# Patient Record
Sex: MALE | Race: WHITE | Employment: FULL TIME | URBAN - METROPOLITAN AREA
[De-identification: names, ages, dates, MRNs, and addresses within clinical notes are randomized per-mention and may not be internally consistent; named-entity substitution may affect disease eponyms.]

---

## 2020-01-09 ENCOUNTER — OFFICE VISIT (OUTPATIENT)
Dept: DERMATOLOGY | Facility: AMBULATORY SURGERY CENTER | Age: 60
End: 2020-01-09

## 2020-01-09 VITALS
OXYGEN SATURATION: 98 % | WEIGHT: 210 LBS | DIASTOLIC BLOOD PRESSURE: 64 MMHG | HEIGHT: 75 IN | BODY MASS INDEX: 26.11 KG/M2 | TEMPERATURE: 98.6 F | SYSTOLIC BLOOD PRESSURE: 124 MMHG | HEART RATE: 61 BPM

## 2020-01-09 DIAGNOSIS — C44.319 BASAL CELL CARCINOMA (BCC) OF RIGHT FOREHEAD: Primary | ICD-10-CM

## 2020-01-09 RX ORDER — ATORVASTATIN CALCIUM 20 MG/1
TABLET, FILM COATED ORAL
Refills: 0 | COMMUNITY
Start: 2019-10-17

## 2020-01-09 NOTE — PROGRESS NOTES
Progress note for Mohs surgery patient:    Chief Complaint:  basal cell carcinoma of the right forehead    HPI:  Jessica Aguirre is a 60y.o. year old male referred by  Eli Martinez MD for Mohs surgery to treat the following lesion:  Lesion Info  Location: right forehead  Size: 0.8 cm x 0.6 cm   Type: basal  Duration: months  Path Lab: [facility]  Path #: [id number]  Prior Treatment: none     Symptoms of the lesion include none. ROS:  Latrelle Sacks is feeling well and in their usual state of health today. He is not in pain. He does not have any other skin concerns. Exam:  Latrelle Sacks is an awake, alert, oriented, well-appearing male in no distress. The face was examined. Findings are:  On the right forehead there is a small pink scar. There are scattered erythematous scaly papules on the forehead and cheeks. A/P:  basal cell carcinoma of the right forehead. The diagnosis was reviewed. The Mohs surgery procedure was reviewed. Indications, risks, and options were discussed with Mr. Lala Waller preoperatively. Risks including, but not limited to: pain, bleeding, infection, tumor recurrence, scarring and damage to motor and/or sensory nerves, were discussed. Mr. Lala Waller chose Mohs surgery. Mr. Lala Waller was an acceptable surgery candidate. I performed Mohs surgery using standard technique after verbal and written consent were obtained. The lesion was identified and confirmed with the patient and photograph, if available. The surgical site was marked with gentian violet, prepped, draped and anesthetized in standard fashion. The tumor was debulked by curettage and orientation hashes were placed. The tumor and any associated scar was excised using beveled incision. Hemostasis was achieved, the site was bandaged, and the tissue was transported to the Mohs lab.   While maintaining anatomic orientation the tissue was divided, if needed, and marked with colored inks that were noted on the corresponding Mohs map. The tissue was prepared by Mohs en-face technique for fresh frozen section analysis. The resulting slides were examined for residual tumor, scar and other concerns, all of which were marked on the corresponding Mohs map, if present. The Mohs map was used to guide subsequent stages of surgery, if necessary, and the above process was repeated until a tumor-free plane was achieved. Once the tumor was cleared the map was marked as such and signed. Dr. Dayron Lara acted as surgeon and pathologist for the entire case, performing all stages of the surgical excision as well as examination and interpretation of the histologic slides. See table below for details regarding the surgical case. 1 stage(s) were required to reach a tumor-free plane, resulting in a 0.9 cm x 0.8 cm  defect extending to the subcutaneous fat. There were not complications. Kasie Belle will follow up as needed in the postoperative period. Regular skin examinations will be with  Alexis Piña MD.    The wound management options of second intent healing, layered closure, local flap, and/or full thickness skin graft were discussed. Mr. Jcarlos Mcbride understands the aims, risks, alternatives, and possible complications and elects to proceed with a complex layered closure. Wound margins were debeveled, standing cones were removed at both ends, edges widely undermined in the subcutaneous plane (approximately 1 cm circumferentially followed by complex layered closure. The wound was closed with buried 5-0 monocryl suture in the muscle and deep subcutis to reduce width of the wound and a second layer in the dermis to reduce tension on the skin edges with careful attention to edge apposition and eversion for optimal cosmesis. Epidermal edges were carefully approximated with 6-0 prolene suture, again with careful attention to apposition and eversion. The final closure length was Closure Length: 3.2 cm .   The wound was bandaged with Petrolatum ointment, Telfa, gauze and Coverroll. Wound care instructions (written and/or verbal) and a follow up appointment were given to Mr. Alexander Le before discharge. Mr. Alexander Le was discharged in good condition. right forehead  Mohs Lesion Operative Report  Date: 01/09/20  Room: Procedure room 1 and procedure room 2  Indications: Site, Poor definition  Pre-op Meds: none  Pre-op BP: 122/78  Pre-op pulse: 70  1st Assistant: Virginia Russ and Leti Lui  Stage #: 1  Stage 1 Sections: 1  Stage 1 # Pos: 0  Perineural Involvement: No  Lymphadenopathy: No  Defect Size: 0.9 cm x 0.8 cm   Depth: subcutaneous fat  Wound Mgt: complex  Suture: Buried, Surface  Buried details: 5.0 monocryl  Surface Details: 6.0 prolene  Undermining: SubQ  Closure Length: 3.2 cm   Estimated Blood Loss: 2 ml  Hemostasis: Pressure, Electrosurgery  Anesthesia: 1% Lidocaine w/1:100,000 epi  1% Lidocaine: 7 cc  Complications: none  Dressing: pressure  Post-op BP: 124/64  Post-op Pulse: 61  Pos-op Meds: none  W/C Instructions: Verbal, Written  Follow-up: 1 week     [facility]   OFFICE PROCEDURE PROGRESS NOTE   Chart reviewed for the following:   Daniel Lester MD have reviewed the History, Physical and updated the Allergic reactions for Frank Washington. TIME OUT performed immediately prior to start of procedure:   Daniel Lester MD, have performed the following reviews on Storm Read   prior to the start of the procedure:     * Patient was identified by name and date of birth   * Agreement on procedure being performed was verified   * Risks and Benefits explained to the patient   * Procedure site verified and marked as necessary   * Patient was positioned for comfort   * Consent was signed and verified     Time: 8 AM  Date of procedure: 1/9/2020  Procedure performed by:  Tatiana Quan MD  Provider assisted by: Virginia Cancino JOSELITO Brush  Patient assisted by: self   How tolerated by patient: tolerated the procedure well with no complications   Comments: none

## 2020-01-09 NOTE — PATIENT INSTRUCTIONS

## 2020-01-09 NOTE — LETTER
1/9/2020 12:08 PM 
 
Patient:  Obie Mcgraw YOB: 1960 Date of Visit: 1/9/2020 Dear Shanita Pak MD 
[address] [id number] VIA Facsimile: [phone] Thank you for referring Obie Mcgraw to me for evaluation/treatment. Below are the relevant portions of my assessment and plan of care. Mr. Alirio Hobson presented today for Mohs surgery to treat a biopsy-proven basal cell carcinoma of the right forehead. 1 stage(s) of Mohs surgery were required to achieve tumor free margins. I repaired the defect with a(n) primary closure. He tolerated the procedure well. Please see the attached procedure note(s) for additional details. Mr. Alirio Hobson will return to me for suture removal and/or wound checks at an appropriate interval and I will follow-up with him regarding any issues arising from or relating to this surgery. I will otherwise defer any additional dermatologic care back to you. If you have questions, please do not hesitate to call me. I look forward to following Mr. Alirio Hobson along with you. Sincerely, Carla Martines MD 
[phone] (cell)

## 2020-01-17 ENCOUNTER — OFFICE VISIT (OUTPATIENT)
Dept: DERMATOLOGY | Facility: AMBULATORY SURGERY CENTER | Age: 60
End: 2020-01-17

## 2020-01-17 DIAGNOSIS — C44.319 BASAL CELL CARCINOMA (BCC) OF RIGHT FOREHEAD: Primary | ICD-10-CM

## 2020-01-17 NOTE — PROGRESS NOTES
Wound check/suture removal:    Chief complaint: wound check. HPI: Trenton Rebollar presents for wound check following Mohs surgery to treat a biopsy-proven basal cell carcinoma on the right forehead repaired primarily performed 1 week ago. Exam: The surgical site was examined. There is not evidence of infection. There is erythema. There is not edema. A/P:  Wound check. The surgical site is healing well. Additional care was reviewed including liberal application of Vaseline several times daily and gentle scar massage starting at 3 weeks postop. Follow up will be as needed.